# Patient Record
Sex: FEMALE | Race: WHITE | NOT HISPANIC OR LATINO | URBAN - METROPOLITAN AREA
[De-identification: names, ages, dates, MRNs, and addresses within clinical notes are randomized per-mention and may not be internally consistent; named-entity substitution may affect disease eponyms.]

---

## 2017-05-30 ENCOUNTER — APPOINTMENT (RX ONLY)
Dept: URBAN - METROPOLITAN AREA CLINIC 127 | Facility: CLINIC | Age: 72
Setting detail: DERMATOLOGY
End: 2017-05-30

## 2017-05-30 DIAGNOSIS — D18.0 HEMANGIOMA: ICD-10-CM

## 2017-05-30 DIAGNOSIS — L82.1 OTHER SEBORRHEIC KERATOSIS: ICD-10-CM

## 2017-05-30 DIAGNOSIS — L81.4 OTHER MELANIN HYPERPIGMENTATION: ICD-10-CM

## 2017-05-30 DIAGNOSIS — L20.89 OTHER ATOPIC DERMATITIS: ICD-10-CM

## 2017-05-30 PROBLEM — L30.9 DERMATITIS, UNSPECIFIED: Status: ACTIVE | Noted: 2017-05-30

## 2017-05-30 PROBLEM — D18.01 HEMANGIOMA OF SKIN AND SUBCUTANEOUS TISSUE: Status: ACTIVE | Noted: 2017-05-30

## 2017-05-30 PROCEDURE — ? COUNSELING

## 2017-05-30 PROCEDURE — 99203 OFFICE O/P NEW LOW 30 MIN: CPT

## 2017-05-30 PROCEDURE — ? PRESCRIPTION

## 2017-05-30 RX ORDER — BETAMETHASONE VALERATE 1 MG/G
CREAM TOPICAL
Qty: 1 | Refills: 2 | Status: ERX | COMMUNITY
Start: 2017-05-30

## 2017-05-30 RX ADMIN — BETAMETHASONE VALERATE: 1 CREAM TOPICAL at 00:00

## 2017-05-30 ASSESSMENT — LOCATION DETAILED DESCRIPTION DERM
LOCATION DETAILED: RIGHT ANTERIOR PROXIMAL THIGH
LOCATION DETAILED: RIGHT POSTERIOR SHOULDER
LOCATION DETAILED: EPIGASTRIC SKIN
LOCATION DETAILED: UPPER STERNUM
LOCATION DETAILED: LEFT POSTERIOR SHOULDER
LOCATION DETAILED: RIGHT SUPERIOR MEDIAL MIDBACK
LOCATION DETAILED: PERIUMBILICAL SKIN

## 2017-05-30 ASSESSMENT — LOCATION SIMPLE DESCRIPTION DERM
LOCATION SIMPLE: RIGHT LOWER BACK
LOCATION SIMPLE: CHEST
LOCATION SIMPLE: RIGHT SHOULDER
LOCATION SIMPLE: RIGHT THIGH
LOCATION SIMPLE: LEFT SHOULDER
LOCATION SIMPLE: ABDOMEN

## 2017-05-30 ASSESSMENT — LOCATION ZONE DERM
LOCATION ZONE: LEG
LOCATION ZONE: ARM
LOCATION ZONE: TRUNK

## 2017-06-09 ENCOUNTER — RX ONLY (OUTPATIENT)
Age: 72
Setting detail: RX ONLY
End: 2017-06-09

## 2017-06-09 RX ORDER — BETAMETHASONE VALERATE 1 MG/G
CREAM TOPICAL
Qty: 1 | Refills: 2 | Status: ERX

## 2017-07-11 ENCOUNTER — IMPORTED ENCOUNTER (OUTPATIENT)
Dept: URBAN - METROPOLITAN AREA CLINIC 31 | Facility: CLINIC | Age: 72
End: 2017-07-11

## 2017-07-11 PROBLEM — H10.403: Noted: 2017-07-11

## 2017-07-11 PROBLEM — H04.123: Noted: 2017-07-11

## 2017-07-11 PROCEDURE — 99214 OFFICE O/P EST MOD 30 MIN: CPT

## 2017-07-11 NOTE — PATIENT DISCUSSION
1.  Allergic Conjunctivitis OU -- Sinus issues also. The condition was  discussed with the patient. Avoidance of allergens and cool compresses were recommended. Pt will use systane gel tid during day and gel in tube at bedtime. Change allegra to claritan and cont flonase. If not better in 3 days pt can start her Lotemax she has at home tid. 2.  Dry Eyes OU:  Start artificials tears. Encouraged regular use. 3.  RTN 2 weeks OC4.  RTN CE DR Pryor 9/17.

## 2017-07-20 ENCOUNTER — IMPORTED ENCOUNTER (OUTPATIENT)
Dept: URBAN - METROPOLITAN AREA CLINIC 31 | Facility: CLINIC | Age: 72
End: 2017-07-20

## 2017-07-20 PROBLEM — H04.123: Noted: 2017-07-20

## 2017-07-20 PROBLEM — H10.403: Noted: 2017-07-20

## 2017-07-20 PROCEDURE — 99213 OFFICE O/P EST LOW 20 MIN: CPT

## 2017-07-20 NOTE — PATIENT DISCUSSION
1.  Allergic Conjunctivitis OU -- Sinus issues also. Pt feels it is better. The condition was  discussed with the patient. Avoidance of allergens and cool compresses were recommended. Pt will cont to use systane gel tid during day and gel in tube at bedtime. Change allegra to claritan and cont flonase. Pt will hold off on [address] [place] [address] for now. 2.  Dry Eyes OU:  Cont artificials tears. Encouraged regular use. 3.  RTN CE DR Pryor 9/17.

## 2017-10-12 ENCOUNTER — IMPORTED ENCOUNTER (OUTPATIENT)
Dept: URBAN - METROPOLITAN AREA CLINIC 31 | Facility: CLINIC | Age: 72
End: 2017-10-12

## 2017-10-12 PROBLEM — H04.123: Noted: 2017-10-12

## 2017-10-12 PROBLEM — H25.813: Noted: 2017-10-12

## 2017-10-12 PROCEDURE — 92014 COMPRE OPH EXAM EST PT 1/>: CPT

## 2017-10-12 NOTE — PATIENT DISCUSSION
1. Combined Types of Cataract OU: Explained how cataracts can effect vision. Recommend clinical observation. The patient was advised to contact us if any change or worsening of vision. 2. Dry Eye OU:  Continue current management with Artificial Tears. 3.  Dry Eye OU:  Start Xiidra BID. Discussed that [place] [employer] helps to increase the production of the patient's own tears and therefore can take 3-4 months for an improvement in symptoms. Use AT frequent. pt using OTC antihistamines for allergies discussed secondary drying effect4. Return for an appointment in 2 months for office call. with Dr. Dawit Chi

## 2018-01-17 ENCOUNTER — APPOINTMENT (RX ONLY)
Dept: URBAN - METROPOLITAN AREA CLINIC 128 | Facility: CLINIC | Age: 73
Setting detail: DERMATOLOGY
End: 2018-01-17

## 2018-01-17 DIAGNOSIS — L82.1 OTHER SEBORRHEIC KERATOSIS: ICD-10-CM

## 2018-01-17 DIAGNOSIS — D18.0 HEMANGIOMA: ICD-10-CM

## 2018-01-17 PROBLEM — D18.01 HEMANGIOMA OF SKIN AND SUBCUTANEOUS TISSUE: Status: ACTIVE | Noted: 2018-01-17

## 2018-01-17 PROBLEM — D23.5 OTHER BENIGN NEOPLASM OF SKIN OF TRUNK: Status: ACTIVE | Noted: 2018-01-17

## 2018-01-17 PROCEDURE — ? COUNSELING

## 2018-01-17 PROCEDURE — 99213 OFFICE O/P EST LOW 20 MIN: CPT

## 2018-01-17 ASSESSMENT — LOCATION DETAILED DESCRIPTION DERM
LOCATION DETAILED: LEFT RIB CAGE
LOCATION DETAILED: EPIGASTRIC SKIN

## 2018-01-17 ASSESSMENT — LOCATION ZONE DERM: LOCATION ZONE: TRUNK

## 2018-01-17 ASSESSMENT — LOCATION SIMPLE DESCRIPTION DERM: LOCATION SIMPLE: ABDOMEN

## 2018-01-17 NOTE — PROCEDURE: MIPS QUALITY
Quality 226: Preventive Care And Screening: Tobacco Use: Screening And Cessation Intervention: Patient screened for tobacco and never smoked
Quality 131: Pain Assessment And Follow-Up: Pain assessment using a standardized tool is documented as negative, no follow-up plan required
Quality 47: Advance Care Plan: Advance Care Planning discussed and documented in the medical record; patient did not wish or was not able to name a surrogate decision maker or provide an advance care plan.
Detail Level: Detailed
Quality 111:Pneumonia Vaccination Status For Older Adults: Pneumococcal Vaccination not Administered or Previously Received, Reason not Otherwise Specified
Quality 130: Documentation Of Current Medications In The Medical Record: Current Medications Documented
Quality 110: Preventive Care And Screening: Influenza Immunization: Influenza immunization was not ordered or administered, reason not given

## 2018-03-27 ENCOUNTER — APPOINTMENT (RX ONLY)
Dept: URBAN - METROPOLITAN AREA CLINIC 127 | Facility: CLINIC | Age: 73
Setting detail: DERMATOLOGY
End: 2018-03-27

## 2018-03-27 DIAGNOSIS — L82.0 INFLAMED SEBORRHEIC KERATOSIS: ICD-10-CM

## 2018-03-27 DIAGNOSIS — L72.0 EPIDERMAL CYST: ICD-10-CM

## 2018-03-27 PROBLEM — D23.4 OTHER BENIGN NEOPLASM OF SKIN OF SCALP AND NECK: Status: ACTIVE | Noted: 2018-03-27

## 2018-03-27 PROCEDURE — ? LIQUID NITROGEN

## 2018-03-27 PROCEDURE — 99213 OFFICE O/P EST LOW 20 MIN: CPT | Mod: 25

## 2018-03-27 PROCEDURE — ? COUNSELING

## 2018-03-27 PROCEDURE — 17110 DESTRUCTION B9 LES UP TO 14: CPT

## 2018-03-27 ASSESSMENT — LOCATION SIMPLE DESCRIPTION DERM: LOCATION SIMPLE: ABDOMEN

## 2018-03-27 ASSESSMENT — LOCATION DETAILED DESCRIPTION DERM
LOCATION DETAILED: LEFT LATERAL ABDOMEN
LOCATION DETAILED: EPIGASTRIC SKIN

## 2018-03-27 ASSESSMENT — LOCATION ZONE DERM: LOCATION ZONE: TRUNK

## 2018-05-02 ENCOUNTER — IMPORTED ENCOUNTER (OUTPATIENT)
Dept: URBAN - METROPOLITAN AREA CLINIC 31 | Facility: CLINIC | Age: 73
End: 2018-05-02

## 2018-05-02 PROBLEM — H16.223: Noted: 2018-05-02

## 2018-05-02 PROBLEM — H25.813: Noted: 2018-05-02

## 2018-05-02 PROCEDURE — 99213 OFFICE O/P EST LOW 20 MIN: CPT

## 2018-05-02 NOTE — PATIENT DISCUSSION
1.  Dry Eye OU:  Continue current management with Xiidra and tears prn.  2.  Combined Types of Cataract OU: Explained how cataracts can effect vision. Recommend clinical observation. The patient was advised to contact us if any change or worsening of vision. 3. Return for an appointment in 6 months for comprehensive exam. with Dr. Camilla Crigler.

## 2018-05-03 ENCOUNTER — APPOINTMENT (RX ONLY)
Dept: URBAN - METROPOLITAN AREA CLINIC 127 | Facility: CLINIC | Age: 73
Setting detail: DERMATOLOGY
End: 2018-05-03

## 2018-05-03 DIAGNOSIS — L82.1 OTHER SEBORRHEIC KERATOSIS: ICD-10-CM

## 2018-05-03 DIAGNOSIS — L259 CONTACT DERMATITIS AND OTHER ECZEMA, UNSPECIFIED CAUSE: ICD-10-CM

## 2018-05-03 DIAGNOSIS — D485 NEOPLASM OF UNCERTAIN BEHAVIOR OF SKIN: ICD-10-CM

## 2018-05-03 DIAGNOSIS — L81.4 OTHER MELANIN HYPERPIGMENTATION: ICD-10-CM

## 2018-05-03 DIAGNOSIS — Z71.89 OTHER SPECIFIED COUNSELING: ICD-10-CM

## 2018-05-03 PROBLEM — D48.5 NEOPLASM OF UNCERTAIN BEHAVIOR OF SKIN: Status: ACTIVE | Noted: 2018-05-03

## 2018-05-03 PROBLEM — L30.8 OTHER SPECIFIED DERMATITIS: Status: ACTIVE | Noted: 2018-05-03

## 2018-05-03 PROCEDURE — ? COUNSELING

## 2018-05-03 PROCEDURE — 99214 OFFICE O/P EST MOD 30 MIN: CPT | Mod: 25

## 2018-05-03 PROCEDURE — ? PRESCRIPTION

## 2018-05-03 PROCEDURE — ? BIOPSY BY SHAVE METHOD

## 2018-05-03 PROCEDURE — 11100: CPT

## 2018-05-03 PROCEDURE — ? TREATMENT REGIMEN

## 2018-05-03 RX ORDER — TRIAMCINOLONE ACETONIDE 1 MG/G
CREAM TOPICAL BID
Qty: 1 | Refills: 1 | Status: ERX | COMMUNITY
Start: 2018-05-03

## 2018-05-03 RX ADMIN — TRIAMCINOLONE ACETONIDE: 1 CREAM TOPICAL at 00:00

## 2018-05-03 ASSESSMENT — LOCATION DETAILED DESCRIPTION DERM
LOCATION DETAILED: RIGHT ANTERIOR PROXIMAL THIGH
LOCATION DETAILED: RIGHT SUPERIOR MEDIAL MIDBACK
LOCATION DETAILED: RIGHT INFERIOR UPPER BACK
LOCATION DETAILED: EPIGASTRIC SKIN

## 2018-05-03 ASSESSMENT — LOCATION ZONE DERM
LOCATION ZONE: TRUNK
LOCATION ZONE: LEG

## 2018-05-03 ASSESSMENT — LOCATION SIMPLE DESCRIPTION DERM
LOCATION SIMPLE: ABDOMEN
LOCATION SIMPLE: RIGHT UPPER BACK
LOCATION SIMPLE: RIGHT THIGH
LOCATION SIMPLE: RIGHT LOWER BACK

## 2018-05-03 NOTE — PROCEDURE: TREATMENT REGIMEN
Initiate Treatment: Shower with warm water and dove soap bar\\nMoisturize daily with CeraVe or Vanicream \\nTAC 0.1% cream BID for two weeks then discontinue
Detail Level: Simple

## 2018-05-03 NOTE — PROCEDURE: BIOPSY BY SHAVE METHOD
Dressing: bandage
Body Location Override (Optional - Billing Will Still Be Based On Selected Body Map Location If Applicable): central abdomen
Destruction After The Procedure: No
Anesthesia Type: 1% lidocaine with epinephrine and a 1:10 solution of 8.4% sodium bicarbonate
Type Of Destruction Used: Electrodesiccation
Biopsy Type: H and E
Lab: [facility]
Post-Care Instructions: I reviewed with the patient in detail post-care instructions. Patient is to keep the biopsy site dry overnight, and then apply bacitracin twice daily until healed. Patient may apply hydrogen peroxide soaks to remove any crusting.
Silver Nitrate Text: The wound bed was treated with silver nitrate after the biopsy was performed.
Biopsy Method: double edge Personna blade
Lab Facility: [address] [facility]
Hemostasis: Aluminum Chloride and Electrocautery
Electrodesiccation Text: The wound bed was treated with electrodesiccation after the biopsy was performed.
Additional Anesthesia Volume In Cc (Will Not Render If 0): 0
Detail Level: Detailed
Was A Bandage Applied: Yes
Curettage Text: The wound bed was treated with curettage after the biopsy was performed.
Billing Type: [employer]
Electrodesiccation And Curettage Text: The wound bed was treated with electrodesiccation and curettage after the biopsy was performed.
Wound Care: Vaseline
Anesthesia Volume In Cc (Will Not Render If 0): 0.5
Consent: Written consent was obtained and risks were reviewed including but not limited to scarring, infection, bleeding, scabbing, incomplete removal, nerve damage and allergy to anesthesia.
Cryotherapy Text: The wound bed was treated with cryotherapy after the biopsy was performed.
Notification Instructions: Patient will be notified of biopsy results. However, patient instructed to call the office if not contacted within 2 weeks.

## 2018-09-17 ENCOUNTER — APPOINTMENT (RX ONLY)
Dept: URBAN - METROPOLITAN AREA CLINIC 126 | Facility: CLINIC | Age: 73
Setting detail: DERMATOLOGY
End: 2018-09-17

## 2018-09-17 DIAGNOSIS — Z41.9 ENCOUNTER FOR PROCEDURE FOR PURPOSES OTHER THAN REMEDYING HEALTH STATE, UNSPECIFIED: ICD-10-CM

## 2018-09-17 PROCEDURE — 99025: CPT

## 2018-09-17 PROCEDURE — ? MEDICAL CONSULTATION: FILLERS

## 2018-09-27 ENCOUNTER — IMPORTED ENCOUNTER (OUTPATIENT)
Dept: URBAN - METROPOLITAN AREA CLINIC 31 | Facility: CLINIC | Age: 73
End: 2018-09-27

## 2018-09-27 PROBLEM — H16.223: Noted: 2018-09-27

## 2018-09-27 PROBLEM — H25.813: Noted: 2018-09-27

## 2018-09-27 PROCEDURE — 92014 COMPRE OPH EXAM EST PT 1/>: CPT

## 2018-09-27 NOTE — PATIENT DISCUSSION
1. Combined Types of Cataract OU: Explained how cataracts can effect vision. Recommend clinical observation. The patient was advised to contact us if any change or worsening of vision. 2. Dry Eye OU:  Continue current management with Xiidra and tears prn.  3.  Return for an appointment in 12 months for comprehensive exam. with Dr. Jessica Faustin.

## 2019-03-27 NOTE — PATIENT DISCUSSION
"OD>OS,""Monitor ERM for changes. Informed patient of potential for worsening.  Instructed patient to call ""

## 2019-04-02 ENCOUNTER — IMPORTED ENCOUNTER (OUTPATIENT)
Dept: URBAN - METROPOLITAN AREA CLINIC 31 | Facility: CLINIC | Age: 74
End: 2019-04-02

## 2019-04-02 PROBLEM — H04.123: Noted: 2019-04-02

## 2019-04-02 PROBLEM — H16.223: Noted: 2019-04-02

## 2019-04-02 PROBLEM — H25.813: Noted: 2019-04-02

## 2019-04-02 PROBLEM — H16.143: Noted: 2019-04-02

## 2019-04-02 PROBLEM — H18.59: Noted: 2019-04-02

## 2019-04-02 PROCEDURE — 99214 OFFICE O/P EST MOD 30 MIN: CPT

## 2019-04-02 NOTE — PATIENT DISCUSSION
1.  Dry Eye OU:  Change to Retaine qid ou and pt was using Kathren Brooms but this last rx is would be $500 and pt did not get. Has not used xiidra for 1 week and she feels it helps her. Pt willl call her ins co and find out why so much? 2.  SPK OS>OD---:   No rubbing. Rx Maxtrol cathy to lids qhs and get some in left eye. 3.  ABMD OU----Reviewed ABMD and risk of corneal erosions and if central visual distortion. Recommend clinical observation artificial tear lubrication during the day. May need Cheko 128ung qhs in future. 4. Combined Types of Cataract OU: Explained how cataracts can effect vision. Recommend clinical observation. The patient was advised to contact us if any change or worsening of vision. 5.  RTN 2 weeks OC--pt will give update on xiidra.  6.  RTN 9/19 CE FEP

## 2019-04-02 NOTE — PATIENT DISCUSSION
1. Combined Types of Cataract OU: Explained how cataracts can effect vision. Recommend clinical observation. The patient was advised to contact us if any change or worsening of vision. 2. Dry Eye OU:  Continue current management with Xiidra and tears prn.  3.  Return for an appointment in 12 months for comprehensive exam. with Dr. Catarina Yo.

## 2019-04-02 NOTE — PATIENT DISCUSSION
Reviewed ABMD and risk of corneal erosions and if central visual distortion.   Recommend clinical observation artificial tear lubrication during the day and Cheko 128ung qhs

## 2019-04-16 ENCOUNTER — IMPORTED ENCOUNTER (OUTPATIENT)
Dept: URBAN - METROPOLITAN AREA CLINIC 31 | Facility: CLINIC | Age: 74
End: 2019-04-16

## 2019-04-16 PROBLEM — H18.59: Noted: 2019-04-16

## 2019-04-16 PROBLEM — H16.143: Noted: 2019-04-16

## 2019-04-16 PROBLEM — H25.813: Noted: 2019-04-16

## 2019-04-16 PROBLEM — H04.123: Noted: 2019-04-16

## 2019-04-16 PROBLEM — H16.223: Noted: 2019-04-16

## 2019-04-16 PROCEDURE — 99213 OFFICE O/P EST LOW 20 MIN: CPT

## 2019-04-16 NOTE — PATIENT DISCUSSION
1.  Dry Eye OU:  Much improved---Cont Retaine qid ou and pt was using Humaira Solo but this last rx is would be $500 and pt did not get. Has not used xiidra  Too expensive. 2.  SPK OS>OD---:   No rubbing. Can use Maxtrol cathy to lids qhs and get some in left eye. Can alternate  or use 2-3x per week. 3.  ABMD OU----Reviewed ABMD and risk of corneal erosions and if central visual distortion. Recommend clinical observation artificial tear lubrication during the day. May need Cheko 128ung qhs in future. 4. Combined Types of Cataract OU: Explained how cataracts can effect vision. Recommend clinical observation. The patient was advised to contact us if any change or worsening of vision. 5.  RTN 2 weeks MRX--wants new gls.    6.  RTN 10/19 CE FEP

## 2019-05-13 ENCOUNTER — APPOINTMENT (RX ONLY)
Dept: URBAN - METROPOLITAN AREA CLINIC 125 | Facility: CLINIC | Age: 74
Setting detail: DERMATOLOGY
End: 2019-05-13

## 2019-05-13 DIAGNOSIS — D18.0 HEMANGIOMA: ICD-10-CM

## 2019-05-13 DIAGNOSIS — I78.8 OTHER DISEASES OF CAPILLARIES: ICD-10-CM

## 2019-05-13 DIAGNOSIS — L81.4 OTHER MELANIN HYPERPIGMENTATION: ICD-10-CM

## 2019-05-13 DIAGNOSIS — Z71.89 OTHER SPECIFIED COUNSELING: ICD-10-CM

## 2019-05-13 DIAGNOSIS — B35.1 TINEA UNGUIUM: ICD-10-CM

## 2019-05-13 DIAGNOSIS — L82.1 OTHER SEBORRHEIC KERATOSIS: ICD-10-CM

## 2019-05-13 DIAGNOSIS — D22 MELANOCYTIC NEVI: ICD-10-CM

## 2019-05-13 PROBLEM — D22.5 MELANOCYTIC NEVI OF TRUNK: Status: ACTIVE | Noted: 2019-05-13

## 2019-05-13 PROBLEM — D18.01 HEMANGIOMA OF SKIN AND SUBCUTANEOUS TISSUE: Status: ACTIVE | Noted: 2019-05-13

## 2019-05-13 PROBLEM — D23.72 OTHER BENIGN NEOPLASM OF SKIN OF LEFT LOWER LIMB, INCLUDING HIP: Status: ACTIVE | Noted: 2019-05-13

## 2019-05-13 PROCEDURE — ? COUNSELING

## 2019-05-13 PROCEDURE — 99214 OFFICE O/P EST MOD 30 MIN: CPT

## 2019-05-13 ASSESSMENT — LOCATION DETAILED DESCRIPTION DERM
LOCATION DETAILED: LEFT INFERIOR CENTRAL MALAR CHEEK
LOCATION DETAILED: RIGHT SUPERIOR MEDIAL MIDBACK
LOCATION DETAILED: RIGHT INFERIOR UPPER BACK
LOCATION DETAILED: RIGHT GREAT TOENAIL
LOCATION DETAILED: LEFT GREAT TOENAIL
LOCATION DETAILED: RIGHT INFERIOR LATERAL MIDBACK
LOCATION DETAILED: LEFT RIB CAGE

## 2019-05-13 ASSESSMENT — LOCATION SIMPLE DESCRIPTION DERM
LOCATION SIMPLE: RIGHT GREAT TOE
LOCATION SIMPLE: LEFT GREAT TOE
LOCATION SIMPLE: RIGHT UPPER BACK
LOCATION SIMPLE: LEFT CHEEK
LOCATION SIMPLE: RIGHT LOWER BACK
LOCATION SIMPLE: ABDOMEN

## 2019-05-13 ASSESSMENT — LOCATION ZONE DERM
LOCATION ZONE: TRUNK
LOCATION ZONE: TOENAIL
LOCATION ZONE: FACE

## 2019-06-25 NOTE — PROCEDURE: LIQUID NITROGEN
Post-Care Instructions: I reviewed with the patient in detail post-care instructions. Patient is to wear sunprotection, and avoid picking at any of the treated lesions. Pt may apply Vaseline to crusted or scabbing areas.
Add 52 Modifier (Optional): no
Medical Necessity Information: It is in your best interest to select a reason for this procedure from the list below. All of these items fulfill various CMS LCD requirements except the new and changing color options.
Consent: The patient's consent was obtained including but not limited to risks of crusting, scabbing, blistering, scarring, darker or lighter pigmentary change, recurrence, incomplete removal and infection.
Detail Level: Simple
Medical Necessity Clause: This procedure was medically necessary because the lesions that were treated were:
emergency contact from SN[facility] communication form - Anshu Hernandez

## 2019-10-31 ENCOUNTER — IMPORTED ENCOUNTER (OUTPATIENT)
Dept: URBAN - METROPOLITAN AREA CLINIC 31 | Facility: CLINIC | Age: 74
End: 2019-10-31

## 2019-10-31 PROBLEM — H25.813: Noted: 2019-10-31

## 2019-10-31 PROBLEM — H18.59: Noted: 2019-10-31

## 2019-10-31 PROBLEM — H16.223: Noted: 2019-10-31

## 2019-10-31 PROCEDURE — 92014 COMPRE OPH EXAM EST PT 1/>: CPT

## 2019-10-31 NOTE — PATIENT DISCUSSION
Keratoconjunctivitis Sicca OU:  Continue current management.   Did well with Pratik Marx but could not afford try sending in again in January to see if cost is less

## 2019-10-31 NOTE — PATIENT DISCUSSION
1. Combined Types of Cataract OU: Explained how cataracts can effect vision. Recommend clinical observation. The patient was advised to contact us if any change or worsening of vision. 2. Keratoconjunctivitis Sicca OU:  Continue current management. Did well with Bradly Koyanagi but could not afford try sending in again in January to see if cost is less3. Reviewed ABMD and risk of corneal erosions and if central visual distortion. Recommend clinical observation artificial tear lubrication during the day 4. Return for an appointment in 12 months for comprehensive exam. with Dr. Dawit Chi

## 2020-02-03 ENCOUNTER — APPOINTMENT (RX ONLY)
Dept: URBAN - METROPOLITAN AREA CLINIC 124 | Facility: CLINIC | Age: 75
Setting detail: DERMATOLOGY
End: 2020-02-03

## 2020-02-03 DIAGNOSIS — L82.1 OTHER SEBORRHEIC KERATOSIS: ICD-10-CM

## 2020-02-03 DIAGNOSIS — D18.0 HEMANGIOMA: ICD-10-CM

## 2020-02-03 DIAGNOSIS — L90.8 OTHER ATROPHIC DISORDERS OF SKIN: ICD-10-CM

## 2020-02-03 PROBLEM — D18.01 HEMANGIOMA OF SKIN AND SUBCUTANEOUS TISSUE: Status: ACTIVE | Noted: 2020-02-03

## 2020-02-03 PROCEDURE — ? DIAGNOSIS COMMENT

## 2020-02-03 PROCEDURE — 99214 OFFICE O/P EST MOD 30 MIN: CPT

## 2020-02-03 PROCEDURE — ? COUNSELING

## 2020-02-03 ASSESSMENT — LOCATION DETAILED DESCRIPTION DERM
LOCATION DETAILED: SUBXIPHOID
LOCATION DETAILED: LEFT INFERIOR ANTERIOR NECK
LOCATION DETAILED: RIGHT CLAVICULAR SKIN
LOCATION DETAILED: LEFT RIB CAGE
LOCATION DETAILED: LEFT UPPER CUTANEOUS LIP
LOCATION DETAILED: RIGHT RIB CAGE

## 2020-02-03 ASSESSMENT — LOCATION ZONE DERM
LOCATION ZONE: TRUNK
LOCATION ZONE: LIP
LOCATION ZONE: NECK

## 2020-02-03 ASSESSMENT — LOCATION SIMPLE DESCRIPTION DERM
LOCATION SIMPLE: LEFT LIP
LOCATION SIMPLE: LEFT ANTERIOR NECK
LOCATION SIMPLE: RIGHT CLAVICULAR SKIN
LOCATION SIMPLE: ABDOMEN

## 2020-02-03 NOTE — PROCEDURE: MIPS QUALITY
Detail Level: Simple
Additional Notes: Patient states she is experiencing no pain 0/10 \\n\\nPatient is currently not on any medications.
Quality 111:Pneumonia Vaccination Status For Older Adults: Pneumococcal Vaccination not Administered or Previously Received, Reason not Otherwise Specified
Quality 131: Pain Assessment And Follow-Up: Pain assessment using a standardized tool is documented as negative, no follow-up plan required

## 2020-07-16 ENCOUNTER — IMPORTED ENCOUNTER (OUTPATIENT)
Dept: URBAN - METROPOLITAN AREA CLINIC 31 | Facility: CLINIC | Age: 75
End: 2020-07-16

## 2020-07-16 PROBLEM — H04.123: Noted: 2020-07-16

## 2020-07-16 PROBLEM — H25.813: Noted: 2020-07-16

## 2020-07-16 PROCEDURE — 92012 INTRM OPH EXAM EST PATIENT: CPT

## 2020-07-16 NOTE — PATIENT DISCUSSION
1.  Dry Eye OU: Worse since stopping Start Xiidra BID. Discussed that [facility] helps to increase the production of the patient's own tears and therefore can take 3-4 months for an improvement in symptoms. Use AT frequent. 2. Combined Types of Cataract OU: Explained how cataracts can effect vision. Recommend clinical observation. The patient was advised to contact us if any change or worsening of vision. 3. Return for an appointment in 3 months for comprehensive exam. with Dr. Rian Frias.

## 2020-08-28 ENCOUNTER — APPOINTMENT (RX ONLY)
Dept: URBAN - METROPOLITAN AREA CLINIC 128 | Facility: CLINIC | Age: 75
Setting detail: DERMATOLOGY
End: 2020-08-28

## 2020-08-28 DIAGNOSIS — L72.0 EPIDERMAL CYST: ICD-10-CM

## 2020-08-28 DIAGNOSIS — L82.0 INFLAMED SEBORRHEIC KERATOSIS: ICD-10-CM

## 2020-08-28 DIAGNOSIS — L82.1 OTHER SEBORRHEIC KERATOSIS: ICD-10-CM

## 2020-08-28 DIAGNOSIS — L73.8 OTHER SPECIFIED FOLLICULAR DISORDERS: ICD-10-CM

## 2020-08-28 DIAGNOSIS — D18.0 HEMANGIOMA: ICD-10-CM

## 2020-08-28 DIAGNOSIS — L57.0 ACTINIC KERATOSIS: ICD-10-CM

## 2020-08-28 PROBLEM — D18.01 HEMANGIOMA OF SKIN AND SUBCUTANEOUS TISSUE: Status: ACTIVE | Noted: 2020-08-28

## 2020-08-28 PROCEDURE — ? PRESCRIPTION

## 2020-08-28 PROCEDURE — 99214 OFFICE O/P EST MOD 30 MIN: CPT | Mod: 25

## 2020-08-28 PROCEDURE — ? TREATMENT REGIMEN

## 2020-08-28 PROCEDURE — ? COUNSELING

## 2020-08-28 PROCEDURE — 17000 DESTRUCT PREMALG LESION: CPT | Mod: 59

## 2020-08-28 PROCEDURE — 17110 DESTRUCTION B9 LES UP TO 14: CPT

## 2020-08-28 PROCEDURE — ? LIQUID NITROGEN

## 2020-08-28 RX ORDER — CLINDAMYCIN PHOSPHATE 10 MG/ML
LOTION TOPICAL QD
Qty: 1 | Refills: 2 | Status: ERX | COMMUNITY
Start: 2020-08-28

## 2020-08-28 RX ADMIN — CLINDAMYCIN PHOSPHATE: 10 LOTION TOPICAL at 00:00

## 2020-08-28 ASSESSMENT — LOCATION SIMPLE DESCRIPTION DERM
LOCATION SIMPLE: RIGHT ANTERIOR NECK
LOCATION SIMPLE: LEFT UPPER BACK
LOCATION SIMPLE: LEFT ANTERIOR NECK
LOCATION SIMPLE: LEFT BREAST
LOCATION SIMPLE: POSTERIOR NECK
LOCATION SIMPLE: LEFT LIP
LOCATION SIMPLE: ABDOMEN
LOCATION SIMPLE: RIGHT UPPER BACK

## 2020-08-28 ASSESSMENT — LOCATION DETAILED DESCRIPTION DERM
LOCATION DETAILED: RIGHT MID-UPPER BACK
LOCATION DETAILED: LEFT CLAVICULAR NECK
LOCATION DETAILED: LEFT UPPER CUTANEOUS LIP
LOCATION DETAILED: LEFT INFERIOR UPPER BACK
LOCATION DETAILED: EPIGASTRIC SKIN
LOCATION DETAILED: LEFT MEDIAL BREAST 7-8:00 REGION
LOCATION DETAILED: RIGHT INFERIOR ANTERIOR NECK
LOCATION DETAILED: LEFT MEDIAL TRAPEZIAL NECK
LOCATION DETAILED: LEFT LOWER CUTANEOUS LIP
LOCATION DETAILED: LEFT INFERIOR ANTERIOR NECK

## 2020-08-28 ASSESSMENT — LOCATION ZONE DERM
LOCATION ZONE: TRUNK
LOCATION ZONE: LIP
LOCATION ZONE: NECK

## 2020-08-28 NOTE — PROCEDURE: MIPS QUALITY
Detail Level: Generalized
Additional Notes: Not taking medication
Quality 130: Documentation Of Current Medications In The Medical Record: Eligible clinician attests to documenting in the medical record the patient is not eligible for a current list of medications being obtained, updated, or reviewed by the eligible clinician
Quality 111:Pneumonia Vaccination Status For Older Adults: Pneumococcal Vaccination not Administered or Previously Received, Reason not Otherwise Specified

## 2020-08-28 NOTE — PROCEDURE: LIQUID NITROGEN
Post-Care Instructions: I reviewed with the patient in detail post-care instructions. Patient is to wear sunprotection, and avoid picking at any of the treated lesions. Pt may apply Vaseline to crusted or scabbing areas.
Detail Level: Detailed
Render Post-Care Instructions In Note?: yes
Number Of Freeze-Thaw Cycles: 2 freeze-thaw cycles
Duration Of Freeze Thaw-Cycle (Seconds): 3
Render Note In Bullet Format When Appropriate: No
Consent: The patient's consent was obtained including but not limited to risks of crusting, scabbing, blistering, scarring, darker or lighter pigmentary change, recurrence, incomplete removal and infection.
Medical Necessity Clause: This procedure was medically necessary because the lesions that were treated were:
Medical Necessity Information: It is in your best interest to select a reason for this procedure from the list below. All of these items fulfill various CMS LCD requirements except the new and changing color options.

## 2020-09-24 ENCOUNTER — IMPORTED ENCOUNTER (OUTPATIENT)
Dept: URBAN - METROPOLITAN AREA CLINIC 31 | Facility: CLINIC | Age: 75
End: 2020-09-24

## 2020-09-24 PROBLEM — H16.223: Noted: 2020-09-24

## 2020-09-24 PROBLEM — H25.813: Noted: 2020-09-24

## 2020-09-24 PROBLEM — H04.123: Noted: 2020-09-24

## 2020-09-24 PROCEDURE — 92014 COMPRE OPH EXAM EST PT 1/>: CPT

## 2020-09-24 NOTE — PATIENT DISCUSSION
1. Combined Types of Cataract OU: Explained how cataracts can effect vision. Recommend clinical observation. The patient was advised to contact us if any change or worsening of vision. 2. Dry Eye OU:  Continue current management with Xiidra and tears prn.  3.  Return for an appointment in 12 months for comprehensive exam. with Dr. Qi Rodríguez

## 2021-10-07 ENCOUNTER — IMPORTED ENCOUNTER (OUTPATIENT)
Dept: URBAN - METROPOLITAN AREA CLINIC 31 | Facility: CLINIC | Age: 76
End: 2021-10-07

## 2021-10-07 PROBLEM — H25.813: Noted: 2021-10-07

## 2021-10-07 PROBLEM — H16.223: Noted: 2021-10-07

## 2021-10-07 PROCEDURE — 92014 COMPRE OPH EXAM EST PT 1/>: CPT

## 2021-10-07 PROCEDURE — 92015 DETERMINE REFRACTIVE STATE: CPT

## 2021-10-07 NOTE — PATIENT DISCUSSION
1. Combined Types of Cataract OU: Explained how cataracts can effect vision. Recommend clinical observation. The patient was advised to contact us if any change or worsening of vision. 2. Dry Eye OU:  Continue current management with Xiidra and tears prn. Cost is very high can change to Klarity C if unable to afford xiidraFollowup on 1 year for comprehensive exam. with Dr. Lazara Canseco.

## 2022-04-02 ASSESSMENT — VISUAL ACUITY
OS_SC: 20/30
OS_SC: 20/20-3
OS_CC: J1+
OS_SC: 20/25-2
OU_SC: 20/20-2
OD_SC: 20/25
OS_SC: 20/30+2
OD_SC: 20/30+2
OD_SC: 20/25
OS_SC: 20/40+3
OS_SC: 20/25-1
OD_SC: 20/25+2
OS_SC: 20/40+2
OD_SC: 20/20-2
OD_SC: 20/20-1
OS_SC: 20/30+2
OD_CC: J1+
OD_SC: 20/25+2
OD_SC: 20/25-1

## 2022-04-02 ASSESSMENT — TONOMETRY
OS_IOP_MMHG: 15
OS_IOP_MMHG: 18
OD_IOP_MMHG: 17
OS_IOP_MMHG: 16
OD_IOP_MMHG: 15
OD_IOP_MMHG: 17
OD_IOP_MMHG: 15
OS_IOP_MMHG: 18
OS_IOP_MMHG: 16
OD_IOP_MMHG: 16
OD_IOP_MMHG: 16
OD_IOP_MMHG: 17
OS_IOP_MMHG: 17
OS_IOP_MMHG: 16

## 2022-06-04 ENCOUNTER — TELEPHONE ENCOUNTER (OUTPATIENT)
Dept: URBAN - METROPOLITAN AREA CLINIC 68 | Facility: CLINIC | Age: 77
End: 2022-06-04

## 2022-06-05 ENCOUNTER — TELEPHONE ENCOUNTER (OUTPATIENT)
Dept: URBAN - METROPOLITAN AREA CLINIC 68 | Facility: CLINIC | Age: 77
End: 2022-06-05

## 2022-06-25 ENCOUNTER — TELEPHONE ENCOUNTER (OUTPATIENT)
Age: 77
End: 2022-06-25

## 2022-06-26 ENCOUNTER — TELEPHONE ENCOUNTER (OUTPATIENT)
Age: 77
End: 2022-06-26

## 2022-08-03 ENCOUNTER — APPOINTMENT (RX ONLY)
Dept: URBAN - METROPOLITAN AREA CLINIC 108 | Facility: CLINIC | Age: 77
Setting detail: DERMATOLOGY
End: 2022-08-03

## 2022-08-03 DIAGNOSIS — Z71.89 OTHER SPECIFIED COUNSELING: ICD-10-CM

## 2022-08-03 DIAGNOSIS — L57.8 OTHER SKIN CHANGES DUE TO CHRONIC EXPOSURE TO NONIONIZING RADIATION: ICD-10-CM

## 2022-08-03 DIAGNOSIS — L82.1 OTHER SEBORRHEIC KERATOSIS: ICD-10-CM

## 2022-08-03 DIAGNOSIS — D22 MELANOCYTIC NEVI: ICD-10-CM

## 2022-08-03 DIAGNOSIS — L259 CONTACT DERMATITIS AND OTHER ECZEMA, UNSPECIFIED CAUSE: ICD-10-CM

## 2022-08-03 DIAGNOSIS — D18.0 HEMANGIOMA: ICD-10-CM

## 2022-08-03 DIAGNOSIS — L92.0 GRANULOMA ANNULARE: ICD-10-CM

## 2022-08-03 DIAGNOSIS — L81.4 OTHER MELANIN HYPERPIGMENTATION: ICD-10-CM

## 2022-08-03 PROBLEM — D22.5 MELANOCYTIC NEVI OF TRUNK: Status: ACTIVE | Noted: 2022-08-03

## 2022-08-03 PROBLEM — L23.9 ALLERGIC CONTACT DERMATITIS, UNSPECIFIED CAUSE: Status: ACTIVE | Noted: 2022-08-03

## 2022-08-03 PROBLEM — D18.01 HEMANGIOMA OF SKIN AND SUBCUTANEOUS TISSUE: Status: ACTIVE | Noted: 2022-08-03

## 2022-08-03 PROCEDURE — ? PRESCRIPTION MEDICATION MANAGEMENT

## 2022-08-03 PROCEDURE — ? COUNSELING

## 2022-08-03 PROCEDURE — 99214 OFFICE O/P EST MOD 30 MIN: CPT

## 2022-08-03 PROCEDURE — ? PRESCRIPTION

## 2022-08-03 RX ORDER — HYDROCORTISONE 25 MG/G
1 CREAM TOPICAL BID
Qty: 30 | Refills: 1 | Status: ERX | COMMUNITY
Start: 2022-08-03

## 2022-08-03 RX ADMIN — HYDROCORTISONE 1: 25 CREAM TOPICAL at 00:00

## 2022-08-03 ASSESSMENT — LOCATION SIMPLE DESCRIPTION DERM
LOCATION SIMPLE: UPPER BACK
LOCATION SIMPLE: LEFT LIP
LOCATION SIMPLE: LEFT FOREARM
LOCATION SIMPLE: LEFT UPPER BACK
LOCATION SIMPLE: RIGHT FOREARM
LOCATION SIMPLE: RIGHT BREAST

## 2022-08-03 ASSESSMENT — LOCATION DETAILED DESCRIPTION DERM
LOCATION DETAILED: INFERIOR THORACIC SPINE
LOCATION DETAILED: LEFT MID-UPPER BACK
LOCATION DETAILED: LEFT SUPERIOR UPPER BACK
LOCATION DETAILED: RIGHT DISTAL DORSAL FOREARM
LOCATION DETAILED: RIGHT MEDIAL BREAST 12-1:00 REGION
LOCATION DETAILED: LEFT DISTAL DORSAL FOREARM
LOCATION DETAILED: SUPERIOR THORACIC SPINE
LOCATION DETAILED: LEFT LOWER CUTANEOUS LIP

## 2022-08-03 ASSESSMENT — LOCATION ZONE DERM
LOCATION ZONE: TRUNK
LOCATION ZONE: LIP
LOCATION ZONE: ARM

## 2022-08-03 NOTE — PROCEDURE: PRESCRIPTION MEDICATION MANAGEMENT
Initiate Treatment: hydrocortisone 2.5 % topical cream BID x 1 week to affected areas prn flare (face) and BID x2 days to arms
Render In Strict Bullet Format?: No
Detail Level: Zone

## 2022-08-03 NOTE — HPI: FULL BODY SKIN EXAMINATION
How Severe Are Your Spot(S)?: mild
How Severe Is This Condition?: mild
What Type Of Note Output Would You Prefer (Optional)?: Standard Output
What Is The Reason For Today's Visit?: Full Body Skin Examination
What Is The Reason For Today's Visit? (Being Monitored For X): concerning skin lesions on an annual basis

## 2022-08-03 NOTE — PROCEDURE: MIPS QUALITY
Quality 111:Pneumonia Vaccination Status For Older Adults: Pneumococcal vaccine administered on or after patientâs 60th birthday and before the end of the measurement period
Detail Level: Detailed
Quality 47: Advance Care Plan: Advance Care Planning discussed and documented; advance care plan or surrogate decision maker documented in the medical record.
Quality 130: Documentation Of Current Medications In The Medical Record: Current Medications Documented
Quality 402: Tobacco Use And Help With Quitting Among Adolescents: Patient screened for tobacco and never smoked

## 2022-09-15 ENCOUNTER — COMPREHENSIVE EXAM (OUTPATIENT)
Dept: URBAN - METROPOLITAN AREA CLINIC 34 | Facility: CLINIC | Age: 77
End: 2022-09-15

## 2022-09-15 ENCOUNTER — PREPPED CHART (OUTPATIENT)
Dept: URBAN - METROPOLITAN AREA CLINIC 34 | Facility: CLINIC | Age: 77
End: 2022-09-15

## 2022-09-15 DIAGNOSIS — H25.813: ICD-10-CM

## 2022-09-15 PROCEDURE — 92014 COMPRE OPH EXAM EST PT 1/>: CPT

## 2022-09-15 PROCEDURE — 92015 DETERMINE REFRACTIVE STATE: CPT

## 2022-09-15 ASSESSMENT — VISUAL ACUITY
OS_CC: 20/25-2
OS_CC: 20/25
OD_CC: 20/25-2
OD_CC: 20/25-1

## 2022-09-15 ASSESSMENT — TONOMETRY
OS_IOP_MMHG: 17
OD_IOP_MMHG: 18

## 2022-09-15 NOTE — PATIENT DISCUSSION
1. Combined Types of Cataract OU: Explained how cataracts can effect vision. Recommend clinical observation. The patient was advised to contact us if any change or worsening of vision. 2. Dry Eye OU:  Continue current management with Xiidra and tears prn. Cost is very high can change to Klarity C if unable to afford xiidraFollowup on 1 year for comprehensive exam. with Dr. Tanya Mcguire.

## 2023-04-06 ENCOUNTER — FOLLOW UP (OUTPATIENT)
Dept: URBAN - METROPOLITAN AREA CLINIC 34 | Facility: CLINIC | Age: 78
End: 2023-04-06

## 2023-04-06 DIAGNOSIS — H57.13: ICD-10-CM

## 2023-04-06 DIAGNOSIS — H16.223: ICD-10-CM

## 2023-04-06 DIAGNOSIS — H25.813: ICD-10-CM

## 2023-04-06 PROCEDURE — 92012 INTRM OPH EXAM EST PATIENT: CPT

## 2023-04-06 ASSESSMENT — VISUAL ACUITY
OS_CC: 20/25
OD_CC: 20/25

## 2023-09-21 ENCOUNTER — COMPREHENSIVE EXAM (OUTPATIENT)
Dept: URBAN - METROPOLITAN AREA CLINIC 34 | Facility: CLINIC | Age: 78
End: 2023-09-21

## 2023-09-21 DIAGNOSIS — H43.812: ICD-10-CM

## 2023-09-21 DIAGNOSIS — H25.813: ICD-10-CM

## 2023-09-21 DIAGNOSIS — H16.223: ICD-10-CM

## 2023-09-21 PROCEDURE — 92014 COMPRE OPH EXAM EST PT 1/>: CPT

## 2023-09-21 ASSESSMENT — VISUAL ACUITY
OS_CC: 20/25+2
OD_CC: 20/20

## 2023-09-21 ASSESSMENT — TONOMETRY
OS_IOP_MMHG: 15
OD_IOP_MMHG: 16

## 2024-11-19 ENCOUNTER — COMPREHENSIVE EXAM (OUTPATIENT)
Dept: URBAN - METROPOLITAN AREA CLINIC 34 | Facility: CLINIC | Age: 79
End: 2024-11-19

## 2024-11-19 DIAGNOSIS — H16.223: ICD-10-CM

## 2024-11-19 DIAGNOSIS — H43.812: ICD-10-CM

## 2024-11-19 DIAGNOSIS — H52.4: ICD-10-CM

## 2024-11-19 DIAGNOSIS — H25.813: ICD-10-CM

## 2024-11-19 PROCEDURE — 92015 DETERMINE REFRACTIVE STATE: CPT

## 2024-11-19 PROCEDURE — 92014 COMPRE OPH EXAM EST PT 1/>: CPT
